# Patient Record
Sex: MALE | Race: WHITE | NOT HISPANIC OR LATINO | URBAN - METROPOLITAN AREA
[De-identification: names, ages, dates, MRNs, and addresses within clinical notes are randomized per-mention and may not be internally consistent; named-entity substitution may affect disease eponyms.]

---

## 2017-04-17 ENCOUNTER — EMERGENCY (EMERGENCY)
Facility: HOSPITAL | Age: 67
LOS: 1 days | Discharge: HOME | End: 2017-04-17
Attending: EMERGENCY MEDICINE | Admitting: EMERGENCY MEDICINE
Payer: MEDICARE

## 2017-04-17 VITALS
DIASTOLIC BLOOD PRESSURE: 79 MMHG | TEMPERATURE: 98 F | RESPIRATION RATE: 18 BRPM | OXYGEN SATURATION: 100 % | HEART RATE: 107 BPM | SYSTOLIC BLOOD PRESSURE: 137 MMHG

## 2017-04-17 VITALS
WEIGHT: 143.96 LBS | TEMPERATURE: 99 F | SYSTOLIC BLOOD PRESSURE: 116 MMHG | RESPIRATION RATE: 18 BRPM | HEART RATE: 106 BPM | OXYGEN SATURATION: 97 % | DIASTOLIC BLOOD PRESSURE: 78 MMHG

## 2017-04-17 DIAGNOSIS — R10.31 RIGHT LOWER QUADRANT PAIN: ICD-10-CM

## 2017-04-17 DIAGNOSIS — R11.0 NAUSEA: ICD-10-CM

## 2017-04-17 LAB
ALBUMIN SERPL ELPH-MCNC: 4.2 G/DL — SIGNIFICANT CHANGE UP (ref 3.4–5)
ALP SERPL-CCNC: 72 U/L — SIGNIFICANT CHANGE UP (ref 40–120)
ALT FLD-CCNC: 41 U/L — SIGNIFICANT CHANGE UP (ref 12–42)
AMMONIA BLD-MCNC: <10 UMOL/L — LOW (ref 11–32)
ANION GAP SERPL CALC-SCNC: 7 MMOL/L — LOW (ref 9–16)
APPEARANCE UR: CLEAR — SIGNIFICANT CHANGE UP
AST SERPL-CCNC: 30 U/L — SIGNIFICANT CHANGE UP (ref 15–37)
BILIRUB SERPL-MCNC: 0.6 MG/DL — SIGNIFICANT CHANGE UP (ref 0.2–1.2)
BILIRUB UR-MCNC: NEGATIVE — SIGNIFICANT CHANGE UP
BUN SERPL-MCNC: 10 MG/DL — SIGNIFICANT CHANGE UP (ref 7–23)
CALCIUM SERPL-MCNC: 10.1 MG/DL — SIGNIFICANT CHANGE UP (ref 8.5–10.5)
CHLORIDE SERPL-SCNC: 106 MMOL/L — SIGNIFICANT CHANGE UP (ref 96–108)
CO2 SERPL-SCNC: 30 MMOL/L — SIGNIFICANT CHANGE UP (ref 22–31)
COLOR SPEC: YELLOW — SIGNIFICANT CHANGE UP
CREAT SERPL-MCNC: 0.83 MG/DL — SIGNIFICANT CHANGE UP (ref 0.5–1.3)
DIFF PNL FLD: NEGATIVE — SIGNIFICANT CHANGE UP
GLUCOSE SERPL-MCNC: 136 MG/DL — HIGH (ref 70–99)
GLUCOSE UR QL: NEGATIVE — SIGNIFICANT CHANGE UP
HCT VFR BLD CALC: 41.8 % — SIGNIFICANT CHANGE UP (ref 39–50)
HGB BLD-MCNC: 14.4 G/DL — SIGNIFICANT CHANGE UP (ref 13–17)
KETONES UR-MCNC: NEGATIVE — SIGNIFICANT CHANGE UP
LEUKOCYTE ESTERASE UR-ACNC: NEGATIVE — SIGNIFICANT CHANGE UP
LIDOCAIN IGE QN: 90 U/L — SIGNIFICANT CHANGE UP (ref 73–393)
MAGNESIUM SERPL-MCNC: 1.9 MG/DL — SIGNIFICANT CHANGE UP (ref 1.6–2.4)
MCHC RBC-ENTMCNC: 33 PG — SIGNIFICANT CHANGE UP (ref 27–34)
MCHC RBC-ENTMCNC: 34.4 G/DL — SIGNIFICANT CHANGE UP (ref 32–36)
MCV RBC AUTO: 95.7 FL — SIGNIFICANT CHANGE UP (ref 80–100)
NITRITE UR-MCNC: NEGATIVE — SIGNIFICANT CHANGE UP
PH UR: 7 — SIGNIFICANT CHANGE UP (ref 4–8.1)
PHOSPHATE SERPL-MCNC: 3.7 MG/DL — SIGNIFICANT CHANGE UP (ref 2.5–4.5)
PLATELET # BLD AUTO: 138 K/UL — LOW (ref 150–400)
POTASSIUM SERPL-MCNC: 4.3 MMOL/L — SIGNIFICANT CHANGE UP (ref 3.5–5.3)
POTASSIUM SERPL-SCNC: 4.3 MMOL/L — SIGNIFICANT CHANGE UP (ref 3.5–5.3)
PROT SERPL-MCNC: 7.4 G/DL — SIGNIFICANT CHANGE UP (ref 6.4–8.2)
PROT UR-MCNC: NEGATIVE MG/DL — SIGNIFICANT CHANGE UP
RBC # BLD: 4.37 M/UL — SIGNIFICANT CHANGE UP (ref 4.2–5.8)
RBC # FLD: 13.5 % — SIGNIFICANT CHANGE UP (ref 10.3–16.9)
SODIUM SERPL-SCNC: 143 MMOL/L — SIGNIFICANT CHANGE UP (ref 132–145)
SP GR SPEC: <=1.005 — SIGNIFICANT CHANGE UP (ref 1–1.03)
UROBILINOGEN FLD QL: 1 E.U./DL — SIGNIFICANT CHANGE UP
WBC # BLD: 10.9 K/UL — HIGH (ref 3.8–10.5)
WBC # FLD AUTO: 10.9 K/UL — HIGH (ref 3.8–10.5)

## 2017-04-17 PROCEDURE — 99284 EMERGENCY DEPT VISIT MOD MDM: CPT

## 2017-04-17 PROCEDURE — 74177 CT ABD & PELVIS W/CONTRAST: CPT | Mod: 26

## 2017-04-17 RX ORDER — SODIUM CHLORIDE 9 MG/ML
1000 INJECTION INTRAMUSCULAR; INTRAVENOUS; SUBCUTANEOUS ONCE
Qty: 0 | Refills: 0 | Status: COMPLETED | OUTPATIENT
Start: 2017-04-17 | End: 2017-04-17

## 2017-04-17 RX ORDER — IOHEXOL 300 MG/ML
50 INJECTION, SOLUTION INTRAVENOUS ONCE
Qty: 0 | Refills: 0 | Status: COMPLETED | OUTPATIENT
Start: 2017-04-17 | End: 2017-04-17

## 2017-04-17 RX ORDER — ONDANSETRON 8 MG/1
4 TABLET, FILM COATED ORAL ONCE
Qty: 0 | Refills: 0 | Status: COMPLETED | OUTPATIENT
Start: 2017-04-17 | End: 2017-04-17

## 2017-04-17 RX ADMIN — ONDANSETRON 4 MILLIGRAM(S): 8 TABLET, FILM COATED ORAL at 05:40

## 2017-04-17 RX ADMIN — IOHEXOL 50 MILLILITER(S): 300 INJECTION, SOLUTION INTRAVENOUS at 05:40

## 2017-04-17 RX ADMIN — SODIUM CHLORIDE 1000 MILLILITER(S): 9 INJECTION INTRAMUSCULAR; INTRAVENOUS; SUBCUTANEOUS at 08:24

## 2017-04-17 RX ADMIN — SODIUM CHLORIDE 1000 MILLILITER(S): 9 INJECTION INTRAMUSCULAR; INTRAVENOUS; SUBCUTANEOUS at 06:12

## 2017-04-17 NOTE — ED PROVIDER NOTE - PROGRESS NOTE DETAILS
Pt signed out by Dr Cole pending CT results. CT with no acute pathology, some non speficic changes, pt will follow these w PMD. pt feels well and is screaming that is hungry and wants to get food. Tolerated po, D/C w copy of all work up.

## 2017-04-17 NOTE — ED ADULT NURSE REASSESSMENT NOTE - NS ED NURSE REASSESS COMMENT FT1
assumed care of patient at this time, currently resting in stretcher, NAD, no new complaints offered, pending CT. Will monitor closely and f/u as indicated.

## 2017-04-17 NOTE — ED PROVIDER NOTE - OBJECTIVE STATEMENT
Patient presenting with abdo pain and weakness- he reports hx of EtOH and Hep C cirrhosis. No f/c. + slight nausea, no v/d. Sx are x 1d. Didn't take anything for it.

## 2017-04-17 NOTE — ED PROVIDER NOTE - MEDICAL DECISION MAKING DETAILS
Patient presenting with abdo pain in the setting of cirrhosis- will check labs and CTAP as pain is not in the hepatic region. Possible malingering.

## 2021-02-08 NOTE — ED PROVIDER NOTE - CONSTITUTIONAL MENTATION
Pulmonary and Critical Care Medicine Progress Note    ADMISSION DATE:  1/22/2021  DATE:  2/8/2021  CURRENT HOSPITAL DAY:  Hospital Day: 18  ATTENDING PHYSICIAN:  Jabari Espinosa MD  CODE STATUS:  Full Resuscitation  Lamin Oliveira  [id number]  1952        SUBJECTIVE:  Patient is sitting up in bed.  No acute distress.  Currently on 4 L nasal cannula oxygen.  States that he feels \"great\".  He is afebrile, hemodynamically stable.  Pending home oxygen evaluation possible discharge home later today.    MEDICATIONS:  SCHEDULED MEDS:  • escitalopram  10 mg Oral Daily   • benzonatate  200 mg Oral TID   • enoxaparin  1 mg/kg Subcutaneous Q12H   • valACYclovir  500 mg Oral 2 times per day   • sodium chloride (PF)  2 mL Intracatheter 2 times per day       CONTINUOUS INFUSIONS:      PRN MEDS:  guaiFENesin-codeine, sodium chloride      REVIEW OF SYSTEMS            Review of Systems   HENT: Negative.  Negative for congestion.    Eyes: Negative.  Negative for visual disturbance.   Respiratory: Positive for shortness of breath. Negative for cough, wheezing and stridor.         Dyspnea on exertion   Cardiovascular: Negative.  Negative for chest pain, palpitations and leg swelling.   Gastrointestinal: Negative.  Negative for abdominal distention, abdominal pain and vomiting.   Genitourinary: Negative.  Negative for difficulty urinating.   Musculoskeletal: Negative.  Negative for arthralgias.   Neurological: Negative.  Negative for dizziness, weakness, light-headedness and headaches.   Hematological: Negative.  Negative for adenopathy.   Psychiatric/Behavioral: Negative.  Negative for confusion.          OBJECTIVE:  VITAL SIGNS:  Vital Last Value 24 Hour Range   Temperature 97.7 °F (36.5 °C) (02/08/21 1508) Temp  Min: 97.2 °F (36.2 °C)  Max: 97.9 °F (36.6 °C)   Pulse 84 (02/08/21 1508) Pulse  Min: 65  Max: 84   Respiratory 16 (02/08/21 1508) Resp  Min: 16  Max: 20   Non-Invasive  Blood Pressure 121/80 (02/08/21 1508) BP  Min: 103/65   Max: 144/75   Pulse Oximetry 94 % (02/08/21 1508) SpO2  Min: 93 %  Max: 100 %     Vital Today Admitted   Weight 91.7 kg (202 lb 4.4 oz) (01/22/21 2018) Weight: 91.7 kg (202 lb 4.4 oz) (01/22/21 2018)   Height N/A Height: 5' 8\" (172.7 cm) (01/22/21 2037)   BMI N/A           VENT SETTINGS LAST 24 HOURS:       HEMODYNAMIC SETTINGS LAST 24 HOURS:       PHYSICAL EXAMINATION:  Physical Exam  Vitals signs reviewed.   Constitutional:       Appearance: Normal appearance. He is obese.   HENT:      Head: Normocephalic and atraumatic.      Nose: Nose normal.      Mouth/Throat:      Mouth: Mucous membranes are moist.   Eyes:      Extraocular Movements: Extraocular movements intact.      Conjunctiva/sclera: Conjunctivae normal.      Pupils: Pupils are equal, round, and reactive to light.   Neck:      Musculoskeletal: Normal range of motion and neck supple.   Cardiovascular:      Rate and Rhythm: Normal rate and regular rhythm.   Pulmonary:      Effort: Pulmonary effort is normal.      Breath sounds: Normal breath sounds.      Comments: No retractions, wheezing or tachypnea  Abdominal:      General: Abdomen is flat. Bowel sounds are normal.      Palpations: Abdomen is soft.   Musculoskeletal: Normal range of motion.         General: No swelling or tenderness.   Skin:     General: Skin is warm and dry.      Capillary Refill: Capillary refill takes less than 2 seconds.   Neurological:      General: No focal deficit present.      Mental Status: He is alert and oriented to person, place, and time. Mental status is at baseline.   Psychiatric:         Mood and Affect: Mood normal.         Behavior: Behavior normal.          LABORATORY DATA:    Labs   Recent Labs   Lab 02/07/21  0625 02/05/21  0635 02/03/21  0730 02/02/21  1104   WBC 11.1* 13.6* 11.9*  --    HCT 40.0 43.6 45.6  --    HGB 12.4* 14.0 14.8  --     173 222  --    SODIUM  --   --   --  139   POTASSIUM  --   --   --  3.7   CHLORIDE  --   --   --  104   CO2  --   --    --  30   GLUCOSE  --   --   --  157*   BUN  --   --   --  31*   CREATININE  --   --   --  0.74   CRP <0.3 <0.3 <0.3  --    , Ferritin 663, D dimer 0.60, CRP < 0.3     IMAGING STUDIES:        IMPRESSION:   Severe sepsis, present on admission supported by heart rate 102, respirations 7, SPO2 72% on room air known COVID-19 infection  Hypoxic respiratory failure due to above    PLAN:   Patient has completed his steroids, acyclovir and DVT prophylaxis per PCP at discharge  Home oxygen evaluation pending  We went over oxygen safety such as no smoking, candles, open flames while on oxygen, patient verbalized understanding  Follow-up in 4 to 6 weeks  Discussed signs and symptoms of hypoxia when to return to the ER  Patient is okay to discharge home with outpatient follow-up from pulmonary standpoint.  Pulmonary will sign off    RODY Dale      Patient dependently seen and examined.  Feeling considerably better.  Oxygen has been weaned down to 4 L/min.  He is walking.  Lungs are clear to auscultation.  Discharge planning as above   oriented to person, place, time/situation

## 2024-03-27 NOTE — ED PROVIDER NOTE - CONSTITUTIONAL DISTRESS
Eusebia informed and note faxed to [phone] at her request.  
Eusebia with [employer] called asking about orders for pt's Flonase.  Asking if she should be using it daily or prn or if she even needs to continue on it at all.  Pt has not been using.  
Please inform that patient does not need to continue taking Flonase. It was prescribed by an urgent care provider in October.  
no apparent